# Patient Record
Sex: FEMALE | Race: WHITE
[De-identification: names, ages, dates, MRNs, and addresses within clinical notes are randomized per-mention and may not be internally consistent; named-entity substitution may affect disease eponyms.]

---

## 2022-06-11 ENCOUNTER — HOSPITAL ENCOUNTER (EMERGENCY)
Dept: HOSPITAL 46 - ED | Age: 57
Discharge: HOME | End: 2022-06-11
Payer: COMMERCIAL

## 2022-06-11 VITALS — WEIGHT: 293 LBS | BODY MASS INDEX: 53.92 KG/M2 | HEIGHT: 62 IN

## 2022-06-11 DIAGNOSIS — E66.9: ICD-10-CM

## 2022-06-11 DIAGNOSIS — R00.2: Primary | ICD-10-CM

## 2022-06-11 DIAGNOSIS — Z88.5: ICD-10-CM

## 2022-06-11 DIAGNOSIS — Z79.899: ICD-10-CM

## 2022-06-11 DIAGNOSIS — I10: ICD-10-CM

## 2022-06-11 NOTE — EKG
[facility]
                                    [address]
                                  [place], [place]  [address]
_________________________________________________________________________________________
                                                                 Signed   
 
 
Normal sinus rhythm with sinus arrhythmia
Normal ECG
No previous ECGs available
Confirmed by KITA TRAN MD ([id number]) on 6/11/2022 2:53:05 PM
 
 
 
 
 
 
 
 
 
 
 
 
 
 
 
 
 
 
 
 
 
 
 
 
 
 
 
 
 
 
 
 
 
 
 
 
 
 
 
 
 
    Electronically Signed By: KITA TRAN MD  06/11/22 1453
_________________________________________________________________________________________
PATIENT NAME:     SARAH KELLY                   
MEDICAL RECORD #: [id number]                     Electrocardiogram             
          ACCT #: [id number]  
DATE OF BIRTH:   [birth year]                                       
PHYSICIAN:   KITA TRAN MD           REPORT #: [id number]
REPORT IS CONFIDENTIAL AND NOT TO BE RELEASED WITHOUT AUTHORIZATION